# Patient Record
Sex: MALE | Race: WHITE | HISPANIC OR LATINO | Employment: OTHER
[De-identification: names, ages, dates, MRNs, and addresses within clinical notes are randomized per-mention and may not be internally consistent; named-entity substitution may affect disease eponyms.]

---

## 2017-01-03 ENCOUNTER — RECORDS - HEALTHEAST (OUTPATIENT)
Dept: ADMINISTRATIVE | Facility: OTHER | Age: 37
End: 2017-01-03

## 2017-01-03 ENCOUNTER — OFFICE VISIT - HEALTHEAST (OUTPATIENT)
Dept: SURGERY | Facility: CLINIC | Age: 37
End: 2017-01-03

## 2017-01-03 DIAGNOSIS — Z48.89 POSTOPERATIVE VISIT: ICD-10-CM

## 2017-10-03 ENCOUNTER — OFFICE VISIT - HEALTHEAST (OUTPATIENT)
Dept: SURGERY | Facility: CLINIC | Age: 37
End: 2017-10-03

## 2017-10-03 DIAGNOSIS — Z89.611 STATUS POST ABOVE KNEE AMPUTATION OF RIGHT LOWER EXTREMITY: ICD-10-CM

## 2017-10-03 ASSESSMENT — MIFFLIN-ST. JEOR: SCORE: 1741.79

## 2017-12-22 ENCOUNTER — TRANSFERRED RECORDS (OUTPATIENT)
Dept: HEALTH INFORMATION MANAGEMENT | Facility: CLINIC | Age: 37
End: 2017-12-22

## 2018-02-27 ENCOUNTER — TRANSFERRED RECORDS (OUTPATIENT)
Dept: HEALTH INFORMATION MANAGEMENT | Facility: CLINIC | Age: 38
End: 2018-02-27

## 2018-03-09 ENCOUNTER — MEDICAL CORRESPONDENCE (OUTPATIENT)
Dept: HEALTH INFORMATION MANAGEMENT | Facility: CLINIC | Age: 38
End: 2018-03-09

## 2018-03-13 ENCOUNTER — TRANSFERRED RECORDS (OUTPATIENT)
Dept: HEALTH INFORMATION MANAGEMENT | Facility: CLINIC | Age: 38
End: 2018-03-13

## 2018-04-02 ENCOUNTER — OFFICE VISIT (OUTPATIENT)
Dept: PHYSICAL MEDICINE AND REHAB | Facility: CLINIC | Age: 38
End: 2018-04-02
Payer: COMMERCIAL

## 2018-04-02 VITALS
SYSTOLIC BLOOD PRESSURE: 141 MMHG | HEIGHT: 72 IN | BODY MASS INDEX: 25.6 KG/M2 | HEART RATE: 79 BPM | DIASTOLIC BLOOD PRESSURE: 76 MMHG | WEIGHT: 189 LBS

## 2018-04-02 DIAGNOSIS — G89.29 CHRONIC RIGHT SHOULDER PAIN: Primary | ICD-10-CM

## 2018-04-02 DIAGNOSIS — M75.01 ADHESIVE CAPSULITIS OF RIGHT SHOULDER: ICD-10-CM

## 2018-04-02 DIAGNOSIS — M67.911 TENDINOPATHY OF RIGHT ROTATOR CUFF: ICD-10-CM

## 2018-04-02 DIAGNOSIS — M75.41 ROTATOR CUFF IMPINGEMENT SYNDROME, RIGHT: ICD-10-CM

## 2018-04-02 DIAGNOSIS — M77.12 LEFT LATERAL EPICONDYLITIS: ICD-10-CM

## 2018-04-02 DIAGNOSIS — M25.511 CHRONIC RIGHT SHOULDER PAIN: Primary | ICD-10-CM

## 2018-04-02 DIAGNOSIS — Z72.0 TOBACCO ABUSE: ICD-10-CM

## 2018-04-02 RX ORDER — GABAPENTIN 300 MG/1
300 CAPSULE ORAL
COMMUNITY
Start: 2017-08-10

## 2018-04-02 RX ORDER — CITALOPRAM HYDROBROMIDE 20 MG/1
20 TABLET ORAL
COMMUNITY

## 2018-04-02 RX ORDER — BACLOFEN 10 MG/1
10 TABLET ORAL
COMMUNITY
Start: 2016-10-24

## 2018-04-02 RX ORDER — ACETAMINOPHEN 325 MG/1
325-650 TABLET ORAL
COMMUNITY
Start: 2016-12-27

## 2018-04-02 RX ORDER — ASPIRIN 81 MG/1
81 TABLET ORAL
COMMUNITY
Start: 2017-10-16

## 2018-04-02 RX ORDER — ATORVASTATIN CALCIUM 80 MG/1
80 TABLET, FILM COATED ORAL
COMMUNITY

## 2018-04-02 RX ORDER — AMLODIPINE BESYLATE 10 MG/1
10 TABLET ORAL
COMMUNITY

## 2018-04-02 ASSESSMENT — PAIN SCALES - GENERAL: PAINLEVEL: WORST PAIN (10)

## 2018-04-02 NOTE — Clinical Note
"Karmen-  Please send a copy of my note to Dr. Royal Motta at [facility] in [place].    Also, please send a letter on top of the clinic note with this information placed in the letter -   \"Dr. Motta-  Thank you for the referral.  I wanted to introduce myself as I am a new Sports Medicine/PM&R physician that started with the Department of Rehabilitation Medicine in December 2017.  I wanted to let you know that I am happy to see patients of yours for nonsurgical orthopedic/sports medicine/musculoskeletal medicine conditions including peripheral joint abnormalities/disorders, nonoperative spine care, et.  My practice is located on the 3rd floor of the [facility].  Please have patients call [phone] to schedule an appointment.    Thanks!  Best Wishes,   Wyatt Eldridge\""

## 2018-04-02 NOTE — MR AVS SNAPSHOT
After Visit Summary   4/2/2018    Huber Jansen    MRN: [id number]           Patient Information     Date Of Birth          1980        Visit Information        Provider Department      4/2/2018 1:00 PM Wyatt Eldridge DO [facility] Physical Medicine and Rehabilitation        Today's Diagnoses     Chronic right shoulder pain    -  1    Adhesive capsulitis of right shoulder        Tendinopathy of right rotator cuff        Rotator cuff impingement syndrome, right        Left lateral epicondylitis          Care Instructions    We addressed the following today:    1. Right shoulder pain/tendinosis/impingement/adhesive capsulitis  2. Stroke  3. Left lateral epicondylitis    Activity modification as discussed  Home exercise program as instructed  Topical Treatments: Ice or Heat  Over the counter medication: Acetaminophen (Tylenol) 1000 mg every 6 hours with food (Maximum of 3000 mg/day)  Other specific instructions: Referral to orthopedic surgery as requested  Call if interested in injection therapy or further PT as discussed  Follow up as needed for further evaluation/medical care (sooner if needed; call direct clinic number [[phone]] at any time with questions/concerns)            Follow-ups after your visit        Additional Services     ORTHOPEDICS ADULT REFERRAL       Your provider has referred you to: [facility]: Orthopaedic Clinic [facility] [phone]   [email or web address]  - Dr. Shah    Please be aware that coverage of these services is subject to the terms and limitations of your health insurance plan.  Call member services at your health plan with any benefit or coverage questions.      Please bring the following to your appointment:    >>   Any x-rays, CTs or MRIs which have been performed.  Contact the facility where they were done to arrange for  prior to your scheduled appointment.    >>   List of current medications   >>   This referral  request   >>   Any documents/labs given to you for this referral                  Who to contact     Please call your clinic at [phone] to:    Ask questions about your health    Make or cancel appointments    Discuss your medicines    Learn about your test results    Speak to your doctor            Additional Information About Your Visit        MyChart Information     [employer] is an electronic gateway that provides easy, online access to your medical records. With [employer], you can request a clinic appointment, read your test results, renew a prescription or communicate with your care team.     To sign up for [employer] visit the website at [email or web address]   You will be asked to enter the access code listed below, as well as some personal information. Please follow the directions to create your username and password.     Your access code is: [email or web address]  Expires: 2018  3:21 PM     Your access code will  in 90 days. If you need help or a new code, please contact your [facility] or call [phone] for assistance.        Care EveryWhere ID     This is your Care EveryWhere ID. This could be used by other organizations to access your [place] medical records  [id number]        Your Vitals Were     Pulse Height BMI (Body Mass Index)             79 1.829 m (6') 25.63 kg/m2          Blood Pressure from Last 3 Encounters:   18 141/76    Weight from Last 3 Encounters:   18 85.7 kg (189 lb)              We Performed the Following     ORTHOPEDICS ADULT REFERRAL        Primary Care Provider Office Phone # Fax #    Royal Motta [phone]       [facility] [address]  [place] [facility] [address]        Equal Access to Services     CATARINA COHEN AH: Hadii deidra Sierra, waneena luong, qaybta liudmila mendieta. So [facility] [phone].    ATENCIÓN: Si rosanna sterling  lopez disposición servicios gratuitos de asistencia lingüística. Heydi bang [phone].    We comply with applicable federal civil rights laws and [place] laws. We do not discriminate on the basis of race, color, national origin, age, disability, sex, sexual orientation, or gender identity.            Thank you!     Thank you for choosing [facility] PHYSICAL MEDICINE AND REHABILITATION  for your care. Our goal is always to provide you with excellent care. Hearing back from our patients is one way we can continue to improve our services. Please take a few minutes to complete the written survey that you may receive in the mail after your visit with us. Thank you!             Your Updated Medication List - Protect others around you: Learn how to safely use, store and throw away your medicines at www.disposemymeds.org.          This list is accurate as of 4/2/18  2:12 PM.  Always use your most recent med list.                   Brand Name Dispense Instructions for use Diagnosis    acetaminophen 325 MG tablet    TYLENOL     Take 325-650 mg by mouth        amLODIPine 10 MG tablet    NORVASC     Take 10 mg by mouth        aspirin EC 81 MG EC tablet      Take 81 mg by mouth        atorvastatin 80 MG tablet    LIPITOR     Take 80 mg by mouth        baclofen 10 MG tablet    LIORESAL     Take 10 mg by mouth        citalopram 20 MG tablet    celeXA     Take 20 mg by mouth        gabapentin 300 MG capsule    NEURONTIN     Take 300 mg by mouth        humaLOG 100 UNIT/ML injection   Generic drug:  insulin lispro      If BG<70, treat per hypoglycemia protocol, then administer dose when patient eats meal.  Breakfast 1 units per 5 CHO grams Lunch 1 units per 5 CHO grams Supper 1 units per 5 CHO grams 10.65 Type 1 with hyperglycemia        ROSUVASTATIN CALCIUM PO      Take 40 mg by mouth daily

## 2018-04-02 NOTE — LETTER
4/2/2018       RE: Huber Jansen  [address]  [place] [address]     Dear Colleague,    Thank you for referring your patient, Huber Jansen, to the [facility] PHYSICAL MEDICINE AND REHABILITATION at [facility]. Please see a copy of my visit note below.    [facility] Physical Medicine & Rehabilitation Clinic Note  Clinic Visit s Apr 2, 2018    Subjective:  Huber Jansen is a 37 year old right-hand dominant male, who is seen in consultation at the request of Dr. Motta for evaluation of chronic right shoulder pain and acute left elbow pain.    Symptoms began 1 year ago for the right shoulder and 1 month ago for the left elbow with physical activities.  Reports insidious onset without acute precipitating event, possibly related to a left-sided stroke at that time for the right shoulder.  Reports right shoulder pain that is located anterolateral with radiation absent.  Notes left lateral elbow pain without radiation.  Symptoms are generally worse with sleeping activities (right shoulder) and and better with Botox injections ([facility] - Dr. An - right shoulder) and elbow strap (left elbow).  Treatment has consisted of physical therapy ([facility]), kinesiotaping, and previous corticosteroid injections ([facility]I in 2017 and 2016 - mostly glenohumeral joint corticosteroid injections via fluoroscopy - improvement of symptoms for 1.5 months).  Denies any numbness/tingling/weakness of the upper extremities.  Notes weakness of the rightupper extremity (improving).  Denies any previous right shoulder injuries/surgeries.    Patient's past medical, surgical, social, and family histories are reviewed today.  Significant medical history includes a left-sided stroke    Review of Systems:  Constitutional: NEGATIVE for fever, chills, or change in weight  Skin: POSITIVE for pimples - last 6 months  Neuro: POSITIVE for weakness of the right upper extremity  MSK: see  HPI    Objective:  /76  Pulse 79  Ht 1.829 m (6')  Wt 85.7 kg (189 lb)  BMI 25.63 kg/m2  General: healthy, alert, and in mild distress  Skin: no suspicious lesions or rashes  Psych: mentation appears normal and affect normal/bright  HEENT: no scleral icterus  CV: no pedal edema  Resp: normal respiratory effort without conversational dyspnea   Neuro: sensory exam is within normal limits.  Motor strength as noted below  Lymph: no palpable lymphadenopathy    MSK:    RIGHT SHOULDER  Inspection:    Decreased shoulder height of the right shoulder compared to left with atrophy noted of the right upper extremity  Palpation:    No tenderness over the AC joint, acromion, anterior capsule, or greater tuberosity    Crepitus is absent  Active Range of Motion:     Abduction 800 /    Passive Range of Motion:    Abduction and forward flexion limited by tightness/pain  Strength:    Scapular plane abduction 4/5  Special Tests:    Positive: Mcarthur' and supraspinatus (empty can)    Negative: Neer's and Yergason's    Contralateral shoulder examined - range of motion, strength, and function within normal limits    LEFT ELBOW  Inspection:    No swelling, bruising, discoloration, or obvious deformity or asymmetry  Palpation:    No tenderness over the lateral epicondyle, common extensor tendon, medial epicondyle, common flexor tendon, olecranon bursa, distal bicep tendon, or radial head/neck  Active Range of Motion:     Extension normal / flexion normal / pronation normal / supination normal  Special Tests:    Positive: none    Negative: Pain with resisted wrist extension, pain with resisted middle finger extension, pain with resisted wrist flexion, pain with resisted supination, and pain with resisted pronation    Imaging:  No x-rays indicated during today's visit  Outside films from [facility] were reviewed today, independent visualization of images was performed, and results were discussed with the patient  MR of  the Right Shoulder - 7/18/2016  Impression:  1.  Mild subscapularis tendinopathy without evidence of tearing.  2. Moderate severity tendinopathy within the anterior third to anterior half of the supraspinatus which demonstrates some attenuation relative to the posterior half of the tendinous attachment suggesting changes of tendinosis are likely subacute to chronic.  No evidence for partial full-thickness tear with no retraction present.  3. Mild tendinopathy within the posterior supraspinatus extending into the anterior infraspinatus.  4. Mild tendinopathy within the intra-articular biceps with associated tenosynovitis.  5. Sprain or inflammation of the in inferior glenohumeral ligament.  6. Mild enthesophyte formation at the acromial tip could potentially predispose to rotator cuff impingement.  Trace fluid in the subacromial space but no significant effacement of the space on this examination.    ASSESSMENT:  1.  Chronic right shoulder pain  2.  Adhesive capsulitis of right shoulder  3.  Right rotator cuff tendinopathy  4.  Right rotator cuff impingement  5.  Left lateral epicondylitis  6.  History of left-sided ischemic stroke  7.  Tobacco abuse    PLAN:  1.  Discussed unpredictable nature of additional treatment options at this time including additional Botulinum toxin injections, a right glenohumeral joint platelet rich plasma injection/viscosupplementation injection/hydrodilatation procedure with ultrasound guidance, surgical intervention for consideration of manipulation under anesthesia, rotator cuff debridement, distal clavicle resection/subacromial decompression, further formal physical therapy etc.  2.  Patient wished to proceed with referral to orthopedic surgery for consideration of operative intervention for further treatment purposes.  3.  Activity modification as discussed, including limitation of activities that cause pain/discomfort.  4.  Acetaminophen/ice/heat as needed for improved pain  control.  5.  Home exercise program given to the patient including left wrist extension/flexion exercises for improvement of current symptom state.  6.  Continue with use of elbow strap as discussed for treatment purposes.  7.  Discussed importance of tobacco cessation for improvement of current symptom state.  8.  Call if interested in injection therapy or further PT as discussed for further treatment purposes.  9.  Follow-up as needed for further evaluation/medical care.      I spent a total of 30 minutes face-to-face with the patient during today's office visit.  Over 50% of the time was spent counseling the patient and/or coordinating care.  See office note for details.    Again, thank you for allowing me to participate in the care of your patient.      Sincerely,    Wyatt Eldridge, DO

## 2018-04-02 NOTE — PROGRESS NOTES
[facility] Physical Medicine & Rehabilitation Clinic Note  Clinic Visit s Apr 2, 2018    Subjective:  Huber Jansen is a 37 year old right-hand dominant male, who is seen in consultation at the request of Dr. Motta for evaluation of chronic right shoulder pain and acute left elbow pain.    Symptoms began 1 year ago for the right shoulder and 1 month ago for the left elbow with physical activities.  Reports insidious onset without acute precipitating event, possibly related to a left-sided stroke at that time for the right shoulder.  Reports right shoulder pain that is located anterolateral with radiation absent.  Notes left lateral elbow pain without radiation.  Symptoms are generally worse with sleeping activities (right shoulder) and and better with Botox injections ([facility] - Dr. An - right shoulder) and elbow strap (left elbow).  Treatment has consisted of physical therapy ([facility]), kinesiotaping, and previous corticosteroid injections ([facility] in 2017 and 2016 - mostly glenohumeral joint corticosteroid injections via fluoroscopy - improvement of symptoms for 1.5 months).  Denies any numbness/tingling/weakness of the upper extremities.  Notes weakness of the rightupper extremity (improving).  Denies any previous right shoulder injuries/surgeries.    Patient's past medical, surgical, social, and family histories are reviewed today.  Significant medical history includes a left-sided stroke    Review of Systems:  Constitutional: NEGATIVE for fever, chills, or change in weight  Skin: POSITIVE for pimples - last 6 months  Neuro: POSITIVE for weakness of the right upper extremity  MSK: see HPI    Objective:  /76  Pulse 79  Ht 1.829 m (6')  Wt 85.7 kg (189 lb)  BMI 25.63 kg/m2  General: healthy, alert, and in mild distress  Skin: no suspicious lesions or rashes  Psych: mentation appears normal and affect normal/bright  HEENT: no scleral icterus  CV: no pedal edema  Resp:  normal respiratory effort without conversational dyspnea   Neuro: sensory exam is within normal limits.  Motor strength as noted below  Lymph: no palpable lymphadenopathy    MSK:    RIGHT SHOULDER  Inspection:    Decreased shoulder height of the right shoulder compared to left with atrophy noted of the right upper extremity  Palpation:    No tenderness over the AC joint, acromion, anterior capsule, or greater tuberosity    Crepitus is absent  Active Range of Motion:     Abduction 800 /    Passive Range of Motion:    Abduction and forward flexion limited by tightness/pain  Strength:    Scapular plane abduction 4/5  Special Tests:    Positive: Mcarthur' and supraspinatus (empty can)    Negative: Neer's and Yergason's    Contralateral shoulder examined - range of motion, strength, and function within normal limits    LEFT ELBOW  Inspection:    No swelling, bruising, discoloration, or obvious deformity or asymmetry  Palpation:    No tenderness over the lateral epicondyle, common extensor tendon, medial epicondyle, common flexor tendon, olecranon bursa, distal bicep tendon, or radial head/neck  Active Range of Motion:     Extension normal / flexion normal / pronation normal / supination normal  Special Tests:    Positive: none    Negative: Pain with resisted wrist extension, pain with resisted middle finger extension, pain with resisted wrist flexion, pain with resisted supination, and pain with resisted pronation    Imaging:  No x-rays indicated during today's visit  Outside films from [facility] were reviewed today, independent visualization of images was performed, and results were discussed with the patient  MR of the Right Shoulder - 7/18/2016  Impression:  1.  Mild subscapularis tendinopathy without evidence of tearing.  2. Moderate severity tendinopathy within the anterior third to anterior half of the supraspinatus which demonstrates some attenuation relative to the posterior half of the tendinous  attachment suggesting changes of tendinosis are likely subacute to chronic.  No evidence for partial full-thickness tear with no retraction present.  3. Mild tendinopathy within the posterior supraspinatus extending into the anterior infraspinatus.  4. Mild tendinopathy within the intra-articular biceps with associated tenosynovitis.  5. Sprain or inflammation of the in inferior glenohumeral ligament.  6. Mild enthesophyte formation at the acromial tip could potentially predispose to rotator cuff impingement.  Trace fluid in the subacromial space but no significant effacement of the space on this examination.    ASSESSMENT:  1.  Chronic right shoulder pain  2.  Adhesive capsulitis of right shoulder  3.  Right rotator cuff tendinopathy  4.  Right rotator cuff impingement  5.  Left lateral epicondylitis  6.  History of left-sided ischemic stroke  7.  Tobacco abuse    PLAN:  1.  Discussed unpredictable nature of additional treatment options at this time including additional Botulinum toxin injections, a right glenohumeral joint platelet rich plasma injection/viscosupplementation injection/hydrodilatation procedure with ultrasound guidance, surgical intervention for consideration of manipulation under anesthesia, rotator cuff debridement, distal clavicle resection/subacromial decompression, further formal physical therapy etc.  2.  Patient wished to proceed with referral to orthopedic surgery for consideration of operative intervention for further treatment purposes.  3.  Activity modification as discussed, including limitation of activities that cause pain/discomfort.  4.  Acetaminophen/ice/heat as needed for improved pain control.  5.  Home exercise program given to the patient including left wrist extension/flexion exercises for improvement of current symptom state.  6.  Continue with use of elbow strap as discussed for treatment purposes.  7.  Discussed importance of tobacco cessation for improvement of current  symptom state.  8.  Call if interested in injection therapy or further PT as discussed for further treatment purposes.  9.  Follow-up as needed for further evaluation/medical care.      I spent a total of 30 minutes face-to-face with the patient during today's office visit.  Over 50% of the time was spent counseling the patient and/or coordinating care.  See office note for details.    Wyatt Eldridge DO, AGUSTIN    Department of Rehabilitation Medicine

## 2018-04-02 NOTE — PATIENT INSTRUCTIONS
We addressed the following today:    1. Right shoulder pain/tendinosis/impingement/adhesive capsulitis  2. Stroke  3. Left lateral epicondylitis    Activity modification as discussed  Home exercise program as instructed  Topical Treatments: Ice or Heat  Over the counter medication: Acetaminophen (Tylenol) 1000 mg every 6 hours with food (Maximum of 3000 mg/day)  Other specific instructions: Referral to orthopedic surgery as requested  Call if interested in injection therapy or further PT as discussed  Follow up as needed for further evaluation/medical care (sooner if needed; call direct clinic number [[phone]] at any time with questions/concerns)

## 2018-04-06 ENCOUNTER — CARE COORDINATION (OUTPATIENT)
Dept: PHYSICAL MEDICINE AND REHAB | Facility: CLINIC | Age: 38
End: 2018-04-06

## 2018-04-06 NOTE — PROGRESS NOTES
Dr Eldridge's PMR clinic note dated 4/2/18 was faxed to \[facility]\"" clinic in [place] [phone], attention Dr Royal Motta.

## 2018-04-25 ENCOUNTER — PRE VISIT (OUTPATIENT)
Dept: ORTHOPEDICS | Facility: CLINIC | Age: 38
End: 2018-04-25

## 2018-04-25 DIAGNOSIS — M25.511 RIGHT SHOULDER PAIN: Primary | ICD-10-CM

## 2018-04-25 NOTE — TELEPHONE ENCOUNTER
Patient is being referred by Wyatt Eldridge for Chronic right shoulder pain, Adhesive capsulitis of right shoulder.Tendinopathy of right rotator cuff Rotator cuff impingement syndrome.    Patient is new to this provider.  Patient has no recent imaging available    Patient is coming to clinic for initial consultation.      Per standing orders, Right shoulder Alyssa views have been ordered and scheduled.     Patient visit type and questionnaires have been reviewed and are correct for this appointment? Yes    Daniel RAMON, CMA

## 2018-04-30 ENCOUNTER — OFFICE VISIT (OUTPATIENT)
Dept: ORTHOPEDICS | Facility: CLINIC | Age: 38
End: 2018-04-30
Payer: COMMERCIAL

## 2018-04-30 ENCOUNTER — RADIANT APPOINTMENT (OUTPATIENT)
Dept: GENERAL RADIOLOGY | Facility: CLINIC | Age: 38
End: 2018-04-30
Attending: ORTHOPAEDIC SURGERY
Payer: COMMERCIAL

## 2018-04-30 VITALS — BODY MASS INDEX: 25.6 KG/M2 | HEIGHT: 72 IN | WEIGHT: 189 LBS

## 2018-04-30 DIAGNOSIS — M25.511 RIGHT SHOULDER PAIN: ICD-10-CM

## 2018-04-30 DIAGNOSIS — G89.29 CHRONIC RIGHT SHOULDER PAIN: Primary | ICD-10-CM

## 2018-04-30 DIAGNOSIS — M25.511 CHRONIC RIGHT SHOULDER PAIN: Primary | ICD-10-CM

## 2018-04-30 ASSESSMENT — ENCOUNTER SYMPTOMS
DEPRESSION: 1
NECK PAIN: 1
MYALGIAS: 1
MUSCLE WEAKNESS: 0
NUMBNESS: 0
WEAKNESS: 1
BACK PAIN: 1
PANIC: 1
NERVOUS/ANXIOUS: 1
SEIZURES: 0
INSOMNIA: 1
SPEECH CHANGE: 1
TREMORS: 0
LOSS OF CONSCIOUSNESS: 0
ARTHRALGIAS: 1
JOINT SWELLING: 1
POOR WOUND HEALING: 0
HEADACHES: 1
PARALYSIS: 0
DIZZINESS: 1
MEMORY LOSS: 1
DISTURBANCES IN COORDINATION: 1
SKIN CHANGES: 0
TINGLING: 0
NAIL CHANGES: 0
DECREASED CONCENTRATION: 1
STIFFNESS: 0

## 2018-04-30 NOTE — PROGRESS NOTES
"CHIEF COMPLAINT:  Right shoulder pain.      HISTORY OF PRESENT ILLNESS:  Mr. Jansen is a very pleasant 37-year-old male with a history of pain and disability in his right shoulder.  He has had chronic shoulder pain since a stroke in 09/2016.  This was a left-sided stroke that affected his right upper extremity.  He has had profound and significant shoulder, elbow and wrist and hand dysfunction since then.  He was seen at [facility] by one of the orthopedic surgeons.  He has been in physical therapy.  He saw Dr. Eldridge here in the Physical Medicine and Rehabilitation Department who recommended that he seek evaluation by me.      Mr. Jansen describes it as a \"tightness\" in the shoulder and then states that the shoulder is \"falling out of place\".  He notes he feels pain anteriorly with any kind of bringing the arm away from the side into abduction.      PHYSICAL EXAMINATION:  On exam today, he has sensation intact to light touch in the axillary nerve distribution, thumb and palm deformity of the hand with no active range of motion whatsoever.  He keeps his elbow in flexion.  He has pain with any range of motion of his shoulder but a biju obvious dyskinetic shoulder motion.      RADIOGRAPHS:  I reviewed 4 views of his shoulder and they show no evidence of fracture, dislocation or abnormal calcific focus.      ASSESSMENT:  Right shoulder status post post-cerebral cerebrovascular accident pain.      PLAN:  I had a nice talk with Mr. Jansen today.  I do not think that there are any surgical interventions which are likely to benefit him.  He has a dysfunctional arm and so even shoulder arthrodesis would not benefit him.  Arthroscopic debridement, arthroscopic capsular resection, hydro plasty, manipulation under anesthesia, and joint replacement were all discussed.  None of these would benefit him as he has too much dysfunction in his musculature to provide him with a reasonable chance of rehabilitation.  Because he has a " dysfunctional hand, arthrodesis is out of the question as well.  He demonstrated some understanding and he can follow up as needed.       Answers for HPI/ROS submitted by the patient on 4/30/2018   General Symptoms: No  Skin Symptoms: Yes  HENT Symptoms: No  EYE SYMPTOMS: No  HEART SYMPTOMS: No  LUNG SYMPTOMS: No  INTESTINAL SYMPTOMS: No  URINARY SYMPTOMS: No  REPRODUCTIVE SYMPTOMS: No  SKELETAL SYMPTOMS: Yes  BLOOD SYMPTOMS: No  NERVOUS SYSTEM SYMPTOMS: Yes  MENTAL HEALTH SYMPTOMS: Yes  Changes in hair: No  Changes in moles/birth marks: No  Itching: Yes  Rashes: Yes  Changes in nails: No  Acne: Yes  Change in facial hair: No  Warts: No  Non-healing sores: No  Scarring: No  Flaking of skin: No  Color changes of hands/feet in cold : No  Sun sensitivity: Yes  Skin thickening: No  Back pain: Yes  Muscle aches: Yes  Neck pain: Yes  Swollen joints: Yes  Joint pain: Yes  Bone pain: No  Muscle weakness: No  Joint stiffness: No  Bone fracture: No  Trouble with coordination: Yes  Dizziness or trouble with balance: Yes  Fainting or black-out spells: No  Memory loss: Yes  Headache: Yes  Seizures: No  Speech problems: Yes  Tingling: No  Tremor: No  Weakness: Yes  Difficulty walking: Yes  Paralysis: No  Numbness: No  Nervous or Anxious: Yes  Depression: Yes  Trouble sleeping: Yes  Trouble thinking or concentrating: Yes  Mood changes: Yes  Panic attacks: Yes

## 2018-04-30 NOTE — MR AVS SNAPSHOT
After Visit Summary   2018    Huber Jansen    MRN: [id number]           Patient Information     Date Of Birth          1980        Visit Information        Provider Department      2018 1:30 PM Rene Shah MD [facility]        Today's Diagnoses     Chronic right shoulder pain    -  1       Follow-ups after your visit        Follow-up notes from your care team     Return if symptoms worsen or fail to improve.      Who to contact     Please call your clinic at [phone] to:    Ask questions about your health    Make or cancel appointments    Discuss your medicines    Learn about your test results    Speak to your doctor            Additional Information About Your Visit        MyChart Information     Mark[employer]ot is an electronic gateway that provides easy, online access to your medical records. With ARDACO, you can request a clinic appointment, read your test results, renew a prescription or communicate with your care team.     To sign up for Markadot visit the website at [email or web address]   You will be asked to enter the access code listed below, as well as some personal information. Please follow the directions to create your username and password.     Your access code is: [email or web address]  Expires: 2018  3:21 PM     Your access code will  in 90 days. If you need help or a new code, please contact your [facility] or call [phone] for assistance.        Care EveryWhere ID     This is your Care EveryWhere ID. This could be used by other organizations to access your [facility] medical records  [id number]        Your Vitals Were     Height BMI (Body Mass Index)                1.829 m (6') 25.63 kg/m2           Blood Pressure from Last 3 Encounters:   18 141/76    Weight from Last 3 Encounters:   18 85.7 kg (189 lb)   18 85.7 kg (189 lb)              Today, you had the following     No orders  found for display       Primary Care Provider Office Phone # Fax #    Royal Motta [phone]       [facility] [address]  [place] [facility] [address]        Equal Access to Services     CATARINA COHEN : Hadii aad ku hadmarloo Soomaali, waaxda luqadaha, qaybta kaalmada adeegyada, waxkuldeep idiin hayaan joe romeroalda mauro. So [facility] [phone].    ATENCIÓN: Si habla español, tiene a lopez disposición servicios gratuitos de asistencia lingüística. Llame al [phone].    We comply with applicable federal civil rights laws and [place] laws. We do not discriminate on the basis of race, color, national origin, age, disability, sex, sexual orientation, or gender identity.            Thank you!     Thank you for choosing [facility]  for your care. Our goal is always to provide you with excellent care. Hearing back from our patients is one way we can continue to improve our services. Please take a few minutes to complete the written survey that you may receive in the mail after your visit with us. Thank you!             Your Updated Medication List - Protect others around you: Learn how to safely use, store and throw away your medicines at www.disposemymeds.org.          This list is accurate as of 4/30/18 11:59 PM.  Always use your most recent med list.                   Brand Name Dispense Instructions for use Diagnosis    acetaminophen 325 MG tablet    TYLENOL     Take 325-650 mg by mouth        amLODIPine 10 MG tablet    NORVASC     Take 10 mg by mouth        aspirin EC 81 MG EC tablet      Take 81 mg by mouth        atorvastatin 80 MG tablet    LIPITOR     Take 80 mg by mouth        baclofen 10 MG tablet    LIORESAL     Take 10 mg by mouth        citalopram 20 MG tablet    celeXA     Take 20 mg by mouth        gabapentin 300 MG capsule    NEURONTIN     Take 300 mg by mouth        humaLOG 100 UNIT/ML injection   Generic drug:  insulin lispro      If BG<70, treat per  hypoglycemia protocol, then administer dose when patient eats meal.  Breakfast 1 units per 5 CHO grams Lunch 1 units per 5 CHO grams Supper 1 units per 5 CHO grams 10.65 Type 1 with hyperglycemia        ROSUVASTATIN CALCIUM PO      Take 40 mg by mouth daily

## 2018-04-30 NOTE — NURSING NOTE
Reason For Visit:   Chief Complaint   Patient presents with     Consult     Discuss surgical options. Chronic right shoulder pain, Adhesive capsulitis of right shoulder.Tendinopathy of right rotator cuff. Rotator cuff impingement syndromeSummit. Appt per pt. Ref by Wyatt Eldridge. Medical records from [facility]       PCP: Royal Motta  Ref: Dr. Eldridge    ?  No  Occupation None.  Currently working? No.  Work status?  On disability.  Date of injury: 04/2016 - 09/2016  Type of injury: Lifting something into the trash - Had a stroke.  Date of surgery: None  Type of surgery: None.  Smoker: Yes  Request smoking cessation information: No    right hand dominant    SANE score  Affected shoulder: Right  Right shoulder SANE: 5  Left shoulder SANE: 100    Dynamometer  Forward Elevation:  R = 5 pounds,  L = 25 pounds  External Rotation:   R = 0 pounds,  L = 22 pounds    Ht 1.829 m (6')  Wt 85.7 kg (189 lb)  BMI 25.63 kg/m2      Pain Assessment  Patient Currently in Pain: Yes  0-10 Pain Scale: 3  Primary Pain Location: Shoulder  Pain Orientation: Right  Pain Descriptors: Dull, Aching, Sore  Alleviating Factors: Rest  Aggravating Factors: Lying, Movement, Reaching, Stretching, Bending, Other (comment) (lifting)      Daniel RAMON, CMA

## 2018-04-30 NOTE — LETTER
"4/30/2018       RE: Huber Jansen  [address]  [facility] [address]     Dear Colleague,    Thank you for referring your patient, Huber Jansen, to the [facility] at [facility]. Please see a copy of my visit note below.    CHIEF COMPLAINT:  Right shoulder pain.      HISTORY OF PRESENT ILLNESS:  Mr. Jansen is a very pleasant 37-year-old male with a history of pain and disability in his right shoulder.  He has had chronic shoulder pain since a stroke in 09/2016.  This was a left-sided stroke that affected his right upper extremity.  He has had profound and significant shoulder, elbow and wrist and hand dysfunction since then.  He was seen at [facility] by one of the orthopedic surgeons.  He has been in physical therapy.  He saw Dr. Eldridge here in the Physical Medicine and Rehabilitation Department who recommended that he seek evaluation by me.      Mr. Jansen describes it as a \"tightness\" in the shoulder and then states that the shoulder is \"falling out of place\".  He notes he feels pain anteriorly with any kind of bringing the arm away from the side into abduction.      PHYSICAL EXAMINATION:  On exam today, he has sensation intact to light touch in the axillary nerve distribution, thumb and palm deformity of the hand with no active range of motion whatsoever.  He keeps his elbow in flexion.  He has pain with any range of motion of his shoulder but a biju obvious dyskinetic shoulder motion.      RADIOGRAPHS:  I reviewed 4 views of his shoulder and they show no evidence of fracture, dislocation or abnormal calcific focus.      ASSESSMENT:  Right shoulder status post post-cerebral cerebrovascular accident pain.      PLAN:  I had a nice talk with Mr. Jansen today.  I do not think that there are any surgical interventions which are likely to benefit him.  He has a dysfunctional arm and so even shoulder arthrodesis would not benefit him.  Arthroscopic " debridement, arthroscopic capsular resection, hydro plasty, manipulation under anesthesia, and joint replacement were all discussed.  None of these would benefit him as he has too much dysfunction in his musculature to provide him with a reasonable chance of rehabilitation.  Because he has a dysfunctional hand, arthrodesis is out of the question as well.  He demonstrated some understanding and he can follow up as needed.       Again, thank you for allowing me to participate in the care of your patient.      Sincerely,    Rene Shah MD

## 2020-03-11 ENCOUNTER — COMMUNICATION - HEALTHEAST (OUTPATIENT)
Dept: ADMINISTRATIVE | Facility: CLINIC | Age: 40
End: 2020-03-11

## 2020-03-11 ENCOUNTER — RECORDS - HEALTHEAST (OUTPATIENT)
Dept: ADMINISTRATIVE | Facility: OTHER | Age: 40
End: 2020-03-11

## 2021-05-25 ENCOUNTER — RECORDS - HEALTHEAST (OUTPATIENT)
Dept: ADMINISTRATIVE | Facility: CLINIC | Age: 41
End: 2021-05-25

## 2021-05-28 ENCOUNTER — RECORDS - HEALTHEAST (OUTPATIENT)
Dept: ADMINISTRATIVE | Facility: CLINIC | Age: 41
End: 2021-05-28

## 2021-05-30 ENCOUNTER — RECORDS - HEALTHEAST (OUTPATIENT)
Dept: ADMINISTRATIVE | Facility: CLINIC | Age: 41
End: 2021-05-30

## 2021-05-31 VITALS — WEIGHT: 175 LBS | BODY MASS INDEX: 23.7 KG/M2 | HEIGHT: 72 IN

## 2021-06-06 NOTE — TELEPHONE ENCOUNTER
External - [facility] -   Referring Provider: Royal Motta  DX: Dm Type 1    Ref./rec. Were received on... 3/11/2020 10:02am (inside End[facility]).

## 2021-06-08 NOTE — PROGRESS NOTES
Huber comes in for follow-up of her right above-knee amputation.  This unfortunate gentleman was initially seen at [facility] about 6 months ago with a severe stroke with resultant right upper extremity hemiparesis.  He then developed ischemia of his right leg.  They attempted stenting there but this unfortunately failed.  After much discussion and second opinion the patient opted for an amputation.  Since then he's apparently been doing well.  He is now home from rehab.    Physical exam:  Is moving around reasonably well considering he has complete paralysis of his right upper extremity.  The right stump looks excellent.  I removed the staples today.  The skin is healthy.  The incision is completely healed.    Impression: Status post right above-knee amputation.  Healing very well.  Given him a prescription for a prosthesis and I think they can start feeding him at any time.  He is also going to be following up with Dr. Dagoberto Colmenares is far as his left leg is concerned.  I told him follow-up with me can be as needed.  If he has any questions concerns about anything going on with his right stump I would want to see him back.

## 2021-06-13 NOTE — PROGRESS NOTES
Huber comes in today with some complaints of pain in his right leg.  It is primarily in the lateral aspect of his thigh.  It is worse toward the end of the day and sometimes keeps him up at night.  He has been seeing a physical therapist and he states that when they do massage to the area it helps quite a bit.  He otherwise feels like he is doing fairly well.  He does still use a wheelchair fair amount but is ambulatory with his prosthesis.    Physical exam:  /76  Pulse 81  Ht 6' (1.829 m)  Wt 175 lb (79.4 kg)  SpO2 96%  BMI 23.73 kg/m2  General: Healthy appearing young man.  He has no use of his right arm.  Extremities: Right above knee amputation site looks healthy.  He has hair on his leg consistent with good vascularity.  The quadriceps muscles are soft and nontender no palpable masses.  I do not feel any evidence of neuromas at the amputation site.    Impression: I suspect some of this pain is just in the use of the muscle with walking with his prosthesis.  Unfortunately I do not think there is much I have to offer.  It looks very healthy and he is glad to hear that.  He says the pain is not bad and he can live with it he just wanted to sure there was nothing wrong.  I did offer him a visit to the pain clinic but he does not think that is necessary.  He does get relief with massage and I told him perhaps he could find even a medical massage therapist work to get covered by insurance.  He is going to look into that.  Follow-up with me will be as needed.

## 2021-10-10 ENCOUNTER — HOSPITAL ENCOUNTER (EMERGENCY)
Facility: CLINIC | Age: 41
Discharge: HOME | End: 2021-10-11
Payer: MEDICARE

## 2021-10-10 VITALS
TEMPERATURE: 98.2 F | HEART RATE: 83 BPM | RESPIRATION RATE: 16 BRPM | OXYGEN SATURATION: 97 % | DIASTOLIC BLOOD PRESSURE: 93 MMHG | SYSTOLIC BLOOD PRESSURE: 187 MMHG | BODY MASS INDEX: 21.7 KG/M2 | WEIGHT: 160 LBS

## 2022-06-11 ENCOUNTER — HOSPITAL ENCOUNTER (EMERGENCY)
Facility: CLINIC | Age: 42
Discharge: HOME | End: 2022-06-11
Attending: FAMILY MEDICINE | Admitting: FAMILY MEDICINE
Payer: MEDICARE

## 2022-06-11 VITALS
SYSTOLIC BLOOD PRESSURE: 154 MMHG | HEART RATE: 79 BPM | RESPIRATION RATE: 18 BRPM | TEMPERATURE: 98.3 F | BODY MASS INDEX: 23.7 KG/M2 | HEIGHT: 72 IN | WEIGHT: 175 LBS | OXYGEN SATURATION: 97 % | DIASTOLIC BLOOD PRESSURE: 83 MMHG

## 2022-06-11 DIAGNOSIS — S05.02XA ABRASION OF LEFT CORNEA, INITIAL ENCOUNTER: ICD-10-CM

## 2022-06-11 PROCEDURE — 250N000009 HC RX 250: Performed by: FAMILY MEDICINE

## 2022-06-11 PROCEDURE — 99283 EMERGENCY DEPT VISIT LOW MDM: CPT

## 2022-06-11 RX ORDER — ERYTHROMYCIN 5 MG/G
0.5 OINTMENT OPHTHALMIC 2 TIMES DAILY
Qty: 5 G | Refills: 0 | Status: SHIPPED | OUTPATIENT
Start: 2022-06-11 | End: 2022-06-16

## 2022-06-11 RX ORDER — TETRACAINE HYDROCHLORIDE 5 MG/ML
2 SOLUTION OPHTHALMIC ONCE
Status: COMPLETED | OUTPATIENT
Start: 2022-06-11 | End: 2022-06-11

## 2022-06-11 RX ADMIN — FLUORESCEIN SODIUM 1 STRIP: 1 STRIP OPHTHALMIC at 22:04

## 2022-06-11 RX ADMIN — TETRACAINE HYDROCHLORIDE 2 DROP: 5 SOLUTION OPHTHALMIC at 22:05

## 2022-06-11 ASSESSMENT — ENCOUNTER SYMPTOMS
EYE PAIN: 0
EYE REDNESS: 1

## 2022-06-12 NOTE — ED TRIAGE NOTES
"Pt presents to the ED with c/o left eye pain and redness since earlier this morning. Pt states it \"feels like something is in it\". 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       "

## 2022-06-12 NOTE — ED PROVIDER NOTES
EMERGENCY DEPARTMENT ENCOUNTER      NAME: Huber Jansen  AGE: 41 year old male  YOB: 1980  MRN: [id number]  EVALUATION DATE & TIME: 6/11/2022  9:26 PM    PCP: Royal Motta    ED PROVIDER: Dagoberto Underwood M.D.    Chief Complaint   Patient presents with     Eye Problem       FINAL IMPRESSION:  1. Abrasion of left cornea, initial encounter        ED COURSE & MEDICAL DECISION MAKING:    Pertinent Labs & Imaging studies personally reviewed and interpreted by me. (See chart for details)  10:00 PM Patient seen and examined, prior records reviewed.  Patient with left eye pain today, he is already blind in this eye so no vision changes.  On fluorescein stain there is a corneal abrasion which likely is the source of his discomfort.  With Romycin ointment ordered and should follow-up with ophthalmology.    At the conclusion of the encounter I discussed the results of all of the tests and the disposition. The questions were answered. The patient or family acknowledged understanding and was agreeable with the care plan.     PROCEDURES:   Procedures       PROCEDURE: Woods lamp Exam   INDICATIONS: Left eye irritation   PROCEDURE PROVIDER: Dr Dagoberto Underwood    SITE: Left eye   CONSENT:   The risks, benefits and alternatives for this procedure were explained to the patient and verbally accepted.     MEDICATION: fluorescein stain and tetracaine   EXAM FINDINGS: Left Eye: 3 mm corneal abrasion medial to pupil   COMPLICATIONS: Patient tolerated procedure well, without complication        MEDICATIONS GIVEN IN THE EMERGENCY:  Medications   tetracaine (PONTOCAINE) 0.5 % ophthalmic solution 2 drop (2 drops Left Eye Given 6/11/22 2205)   fluorescein (FUL-KARLY) ophthalmic strip 1 strip (1 strip Left Eye Given 6/11/22 2204)       NEW PRESCRIPTIONS STARTED AT TODAY'S ER VISIT  New Prescriptions    ERYTHROMYCIN (ROMYCIN) 5 MG/GM OPHTHALMIC OINTMENT    Place 0.5 inches Into the left eye 2 times daily for 5 days        =================================================================    HPI    Patient information was obtained from: patient      Huber Jansen is a 41 year old male with a pertinent history of insulin dependent DM I, CVA anticoagulated on Plavix, and left eye blindness, who presents to this ED via private vehicle for evaluation of eye irritation.    Patient reports onset of left eye irritation and erythema that began this afternoon, noting it feels as though something is stuck in his eye. Denies eye pain. Otherwise denies any other symptoms or concerns at this time.       REVIEW OF SYSTEMS   Review of Systems   Eyes: Positive for redness (left). Negative for pain.        Positive for left eye irritation   All other systems reviewed and are negative.     All other systems reviewed and negative    PAST MEDICAL HISTORY:  No past medical history on file.    PAST SURGICAL HISTORY:  Past Surgical History:   Procedure Laterality Date     AMPUTATE LEG ABOVE KNEE Right 12/9/2016    Procedure: AMPUTATION ABOVE KNEE;  Surgeon: Ivonne Ramos MD;  Location: [facility] OR;  Service:      AMPUTATE TOE(S) Right      EYE SURGERY Right      IR EXTREMITY ANGIOGRAM RIGHT  12/5/2016     OTHER SURGICAL HISTORY Right     stent       CURRENT MEDICATIONS:    No current facility-administered medications for this encounter.     Current Outpatient Medications   Medication     erythromycin (ROMYCIN) 5 MG/GM ophthalmic ointment     acetaminophen (TYLENOL) 325 MG tablet     amLODIPine (NORVASC) 10 MG tablet     aspirin EC 81 MG EC tablet     atorvastatin (LIPITOR) 80 MG tablet     baclofen (LIORESAL) 10 MG tablet     citalopram (CELEXA) 20 MG tablet     gabapentin (NEURONTIN) 300 MG capsule     insulin lispro (HUMALOG) 100 UNIT/ML injection     ROSUVASTATIN CALCIUM PO       ALLERGIES:  No Known Allergies    FAMILY HISTORY:  Family History   Problem Relation Age of Onset     No Known Problems Mother      No Known Problems Father         SOCIAL HISTORY:   Social History     Socioeconomic History     Marital status: Single   Tobacco Use     Smoking status: Former Smoker     Packs/day: 0.50     Years: 20.00     Pack years: 10.00     Types: Cigarettes     Quit date: 2016     Years since quittin.7     Smokeless tobacco: Never Used   Substance and Sexual Activity     Alcohol use: No     Drug use: No       VITALS:  BP (!) 154/83   Pulse 79   Temp 98.3  F (36.8  C) (Temporal)   Resp 18   Ht 1.829 m (6')   Wt 79.4 kg (175 lb)   SpO2 97%   BMI 23.73 kg/m      PHYSICAL EXAM:  Physical Exam  Vitals and nursing note reviewed.   Constitutional:       Appearance: Normal appearance.   HENT:      Head: Normocephalic and atraumatic.      Right Ear: External ear normal.      Left Ear: External ear normal.      Nose: Nose normal.   Eyes:      Extraocular Movements: Extraocular movements intact.      Conjunctiva/sclera:      Left eye: Left conjunctiva is injected.      Pupils: Pupils are unequal (constricted left pupil).      Left eye: Corneal abrasion (3 mm irregular corneal abrasion to left medial iris) present.      Comments: Disconjugate gaze   Pulmonary:      Effort: Pulmonary effort is normal.   Musculoskeletal:         General: No swelling or deformity. Normal range of motion.      Cervical back: Normal range of motion.   Neurological:      General: No focal deficit present.      Mental Status: He is alert and oriented to person, place, and time. Mental status is at baseline.   Psychiatric:         Mood and Affect: Mood normal.         Behavior: Behavior normal.         Thought Content: Thought content normal.            I,  Nette Reyes, am serving as a scribe to document services personally performed by Dr. Underwood based on my observation and the provider's statements to me. I, Dagoberto Underwood MD attest that Nette Reyes is acting in a scribe capacity, has observed my performance of the services and has documented them in  accordance with my direction.    Dagoberto Underwood M.D.  Emergency Medicine  [facility] EMERGENCY ROOM  [address] [facility] [id number] [phone]  Dept: [phone]       Dagoberto Underwood MD  06/11/22 [id number]

## 2023-06-04 ENCOUNTER — APPOINTMENT (OUTPATIENT)
Dept: RADIOLOGY | Facility: CLINIC | Age: 43
End: 2023-06-04
Attending: EMERGENCY MEDICINE
Payer: MEDICARE

## 2023-06-04 ENCOUNTER — HOSPITAL ENCOUNTER (EMERGENCY)
Facility: CLINIC | Age: 43
Discharge: HOME | End: 2023-06-04
Attending: EMERGENCY MEDICINE | Admitting: EMERGENCY MEDICINE
Payer: MEDICARE

## 2023-06-04 VITALS
BODY MASS INDEX: 23.7 KG/M2 | TEMPERATURE: 98.6 F | WEIGHT: 175 LBS | OXYGEN SATURATION: 97 % | HEIGHT: 72 IN | DIASTOLIC BLOOD PRESSURE: 62 MMHG | RESPIRATION RATE: 16 BRPM | SYSTOLIC BLOOD PRESSURE: 126 MMHG | HEART RATE: 77 BPM

## 2023-06-04 DIAGNOSIS — L03.012 ACUTE PARONYCHIA OF FINGER, LEFT: ICD-10-CM

## 2023-06-04 PROCEDURE — 10060 I&D ABSCESS SIMPLE/SINGLE: CPT | Mod: F2

## 2023-06-04 PROCEDURE — 73130 X-RAY EXAM OF HAND: CPT | Mod: LT

## 2023-06-04 PROCEDURE — 99283 EMERGENCY DEPT VISIT LOW MDM: CPT | Mod: 25

## 2023-06-04 RX ORDER — SULFAMETHOXAZOLE/TRIMETHOPRIM 800-160 MG
2 TABLET ORAL 2 TIMES DAILY
Qty: 28 TABLET | Refills: 0 | Status: SHIPPED | OUTPATIENT
Start: 2023-06-04 | End: 2023-06-11

## 2023-06-04 ASSESSMENT — ACTIVITIES OF DAILY LIVING (ADL): ADLS_ACUITY_SCORE: 35

## 2023-06-05 NOTE — ED PROVIDER NOTES
EMERGENCY DEPARTMENT ENCOUNTER      NAME: Huber Jansen  AGE: 42 year old male  YOB: 1980  MRN: [id number]  EVALUATION DATE & TIME: 6/4/2023  9:43 PM    PCP: Royal Motta    ED PROVIDER: Marbella Colunga MD      Chief Complaint   Patient presents with     Hand Pain       FINAL IMPRESSION:  1. Acute paronychia of finger, left          ED COURSE & MEDICAL DECISION MAKING:    Pertinent Labs & Imaging studies reviewed. (See chart for details)    9:44 PM I introduced myself to the patient, obtained patient history, performed a physical exam, and discussed plan for ED workup including potential diagnostic laboratory/imaging studies and interventions.  10:34 PM Rechecked and reevaluated the patient. Performed I&D as charted below.    42 year old male presents to the Emergency Department for evaluation of left middle finger pain.,  The patient has what appears to be a paronychia on the medial aspect near the nail of the left middle finger.  Question whether potentially there was a small puncture wound from the fence there that allowed infection to begin.  Patient is also wondering this but does not remember exactly.  He is tender over this area of abscess with purulence noted.  He is up-to-date on his tetanus.  X-ray of the left hand was performed to ensure no sign of foreign body, osteomyelitis, gas, fracture, etc.  He does not have any specific bony tenderness of the left middle finger.  Has normal range of motion of the left DIP, PIP, and MCP joints tested individually.  He is neurovascularly intact.  Good distal cap refill.  No sign of flexor or extensor tenosynovitis.  Digital block was performed with a mixture of lidocaine and bupivacaine without epinephrine.  He had successful anesthesia with this.  X-ray was unremarkable Without sign of fracture, dislocation, foreign body, or other abnormality.  No sign of osteomyelitis.  The paronychia was then drained by I&D as described below.  He tolerated this  well.  Purulent material expressed.  This was left open for continued drainage.  Discussed warm soaks to promote continued drainage.  Due to his diabetes will also place him on antibiotics and wrote him a prescription for Bactrim.  Discussed following up with his primary care next week for reevaluation.  He was in agreement with this plan.  He was given indications for return the emergency department.  He voiced understanding.  He was discharged home in stable condition.         At the conclusion of the encounter I discussed the results of all of the tests and the disposition. The questions were answered. The patient or family acknowledged understanding and was agreeable with the care plan.         Medical Decision Making    History:    Supplemental history from: Documented in chart, if applicable    External Record(s) reviewed: Documented in chart, if applicable.    Work Up:    Chart documentation includes differential considered and any EKGs or imaging independently interpreted by provider.    In additional to work up documented, I considered the following work up: Documented in chart, if applicable.    External consultation:    Discussion of management with another provider: Documented in chart, if applicable    Complicating factors:    Care impacted by chronic illness: Cerebrovascular Disease, Diabetes, Hyperlipidemia and Hypertension    Care affected by social determinants of health: N/A    Disposition considerations: Discharge. I prescribed additional prescription strength medication(s) as charted. See documentation for any additional details.      MEDICATIONS GIVEN IN THE EMERGENCY:  Medications - No data to display    NEW PRESCRIPTIONS STARTED AT TODAY'S ER VISIT  New Prescriptions    SULFAMETHOXAZOLE-TRIMETHOPRIM (BACTRIM DS) 800-160 MG TABLET    Take 2 tablets by mouth 2 times daily for 7 days     =================================================================    HPI    Patient information was obtained  from: patient    Use of : N/A      Huber Jansen is a 42 year old male with a pertinent history of type I DM, HLD, HTN, CVA with right sided deficits, who presents to this ED for evaluation of left middle finger pain. Patient reports he was hanging a metal fence on Wednesday (5/31) and the door of the fence smashed his left middle finger. Immediate pain in the tip of the left middle finger that has been worsening over the last couple of days. Initially states that there was never any open wounds or cuts but then wonders if the fence may have poked slightly under his nail. Denies any numbness or tingling. He took ibuprofen earlier today with mild relief. He denies any other injuries. Of note, patient reports he is now left hand dominant secondary to right sided deficits with history of stroke.    Patient otherwise denies any fevers. No nausea or vomiting. History of type I DM. Reports that his blood sugars have been around the 190s. Otherwise denies any complaints or concerns.    Of note, last Tdap 1/3/2017.    REVIEW OF SYSTEMS   Review of Systems   Pertinent positives and negatives are documented in the HPI. All other systems reviewed and are negative.      PAST MEDICAL HISTORY:  History reviewed. No pertinent past medical history.    PAST SURGICAL HISTORY:  Past Surgical History:   Procedure Laterality Date     AMPUTATE LEG ABOVE KNEE Right 12/9/2016    Procedure: AMPUTATION ABOVE KNEE;  Surgeon: Ivonne Ramos MD;  Location: [facility];  Service:      AMPUTATE TOE(S) Right      EYE SURGERY Right      IR EXTREMITY ANGIOGRAM RIGHT  12/5/2016     IR LOWER EXTREMITY ANGIOGRAM RIGHT  10/6/2016     OTHER SURGICAL HISTORY Right     stent       CURRENT MEDICATIONS:    sulfamethoxazole-trimethoprim (BACTRIM DS) 800-160 MG tablet  acetaminophen (TYLENOL) 325 MG tablet  amLODIPine (NORVASC) 10 MG tablet  aspirin EC 81 MG EC tablet  atorvastatin (LIPITOR) 80 MG tablet  baclofen (LIORESAL) 10 MG  tablet  citalopram (CELEXA) 20 MG tablet  gabapentin (NEURONTIN) 300 MG capsule  insulin lispro (HUMALOG) 100 UNIT/ML injection  ROSUVASTATIN CALCIUM PO      ALLERGIES:  No Known Allergies    FAMILY HISTORY:  Family History   Problem Relation Age of Onset     No Known Problems Mother      No Known Problems Father        SOCIAL HISTORY:   Social History     Socioeconomic History     Marital status: Single   Tobacco Use     Smoking status: Former     Packs/day: 0.50     Years: 20.00     Pack years: 10.00     Types: Cigarettes     Quit date: 2016     Years since quittin.7     Smokeless tobacco: Never   Substance and Sexual Activity     Alcohol use: No     Drug use: No       VITALS:  BP (!) 149/90   Pulse 87   Temp 98.6  F (37  C) (Oral)   Resp 20   Ht 1.829 m (6')   Wt 79.4 kg (175 lb)   SpO2 99%   BMI 23.73 kg/m      PHYSICAL EXAM    Physical Exam  Constitutional: Well developed, Well nourished, NAD, GCS 15  HENT: Normocephalic, Atraumatic, Bilateral external ears normal, Oropharynx normal, mucous membranes moist, Nose normal. Neck-  Normal range of motion, No tenderness, Supple, No stridor.    Eyes: PERRL, EOMI, Conjunctiva normal, No discharge.   Respiratory: Normal breath sounds, No respiratory distress, No wheezing or crackles, Speaks in full sentences easily.    Cardiovascular: Normal heart rate, Regular rhythm, No murmurs, No rubs, No gallops. 2+ radial pulses bilaterally  GI: Bowel sounds normal, Soft, No tendernes   Musculoskeletal: 2+ DP and radial pulses bilaterally.  Paronychia noted to the left middle finger just medial to the nail with fluctuance and purulence noted under the skin.  No obvious open wounds noted.  No spreading erythema.  No swelling of the left middle digit other than specifically at the paronychia, no significant erythema or induration or fluctuance or tenderness to the remainder of the pulp of the left distal finger.  No sign of felon.  The pulp compartment was soft and  compressible.  No further swelling of the left middle finger.  Normal range of motion at the DIP, PIP, and MCP joint of the left middle finger tested individually.  Sensation intact to light touch in the median, ulnar, and radial aspect with normal  strength of the left hand.  No bony tenderness of the left middle finger.  No tenderness into the left hand.  No tenderness at the left anatomical snuffbox.  Otherwise normal range of motion of the remainder of the fingers and no tenderness of the remainder of the fingers.   Integument: Warm, Dry, paronychia as above  Neurologic: Alert & oriented x 3, 5/5 strength in all 4 extremities bilaterally. Sensation intact to light touch in all 4 extremities and the face bilaterally. No focal deficits noted.   Psychiatric: Affect normal, Judgment normal, Mood normal. Cooperative.      LAB:  All pertinent labs reviewed and interpreted.  Results for orders placed or performed during the hospital encounter of 06/04/23   XR Hand Left G/E 3 Views    Impression    IMPRESSION: No visible fracture or dislocation. Vascular calcification. Chondrocalcinosis of the wrist.       RADIOLOGY:  Reviewed all pertinent imaging. Please see official radiology report.  XR Hand Left G/E 3 Views   Preliminary Result   IMPRESSION: No visible fracture or dislocation. Vascular calcification. Chondrocalcinosis of the wrist.          PROCEDURES:   PROCEDURE: Incision and Drainage   INDICATIONS: Paronychia    PROCEDURE PROVIDER: Dr Marbella Colunga   SITE: Left middle finger   MEDICATION: 2 mLs of 50/50 mix of 1% Lidocaine and 0.5% Bupivacaine without epinephrine   NOTE: The area was prepped with chlorhexidine and draped off in the usual sterile fashion.  Digital block of the finger performed as documented below.  The area of maximal fluctuance was opened with a # 11 Blade (Sharp Point) using a Single Straight incision to allow for drainage.  The abscess was drained. No Packing was placed into the abscess  cavity.  A sterile dressing was placed over the area.   COMPLEXITY: Simple    Simple = single, furuncle, paronychia, superficial  Complex = multiple or abscess requiring probing, loculations, packing placement   COMPLICATIONS: Patient tolerated procedure well, without complication     PROCEDURE: Digital Block   INDICATIONS: Paronychia    PROCEDURE PROVIDER: Dr Marbella Colunga   SITE: Left middle finger (3rd digit)   MEDICATION: 2 mL of 50/50 mix of 1% Lidocaine and 0.5% Bupivacaine without epinephrine   NOTE: The skin overlying the site for injection was prepped with chlorhexidine.  Needle was inserted in a standard three point injection pattern.  Each time the area was aspirated and there was no return of blood.  I then injected the medication at the base of the digit.  The patient had good response to the procedure   COMPLICATIONS: Patient tolerated procedure well, without complication       [facility] Documentation:   CMS Diagnoses:         I, Betito Rasheed, am serving as a scribe to document services personally performed by Marbella Colunga MD based on my observation and the provider's statements to me. I, Marbella Colunga MD, attest that Betito Rasheed is acting in a scribe capacity, has observed my performance of the services and has documented them in accordance with my direction.    Marbella Colunga MD  [facility] EMERGENCY ROOM  [address] [address] [phone]     Marbella Colunga MD  06/15/23 [id number]

## 2023-06-05 NOTE — DISCHARGE INSTRUCTIONS
Warm soaks 4 times a day to promote continued drainage.  Take the antibiotics as prescribed.  Follow-up with your primary care doctor next week for reevaluation.    Return to the ER if you develop fever, severe pain or you are unable to move the finger, worsening swelling, worsening erythema, reaccumulation of pus, numbness, weakness, or any new or worsening concerns.

## 2023-06-05 NOTE — ED TRIAGE NOTES
On Wednesday Pt was hanging a mettle fence and his LT middle finger was smashed in it. Pt is in tonight because the pain and pressure is feeling worse tonight. PT took a dose of ibuprofen around 1700 tonight.      Triage Assessment     Row Name 06/04/23 2746       Triage Assessment (Adult)    Airway WDL WDL       Respiratory WDL    Respiratory WDL WDL       Skin Circulation/Temperature WDL    Skin Circulation/Temperature WDL WDL       Cardiac WDL    Cardiac WDL WDL       Peripheral/Neurovascular WDL    Peripheral Neurovascular WDL WDL       Cognitive/Neuro/Behavioral WDL    Cognitive/Neuro/Behavioral WDL WDL

## 2023-09-03 ENCOUNTER — HOSPITAL ENCOUNTER (EMERGENCY)
Facility: HOSPITAL | Age: 43
Discharge: HOME | End: 2023-09-03
Attending: EMERGENCY MEDICINE | Admitting: EMERGENCY MEDICINE
Payer: MEDICARE

## 2023-09-03 VITALS
TEMPERATURE: 98 F | HEIGHT: 72 IN | RESPIRATION RATE: 18 BRPM | HEART RATE: 78 BPM | OXYGEN SATURATION: 98 % | DIASTOLIC BLOOD PRESSURE: 61 MMHG | WEIGHT: 160 LBS | BODY MASS INDEX: 21.67 KG/M2 | SYSTOLIC BLOOD PRESSURE: 135 MMHG

## 2023-09-03 DIAGNOSIS — M25.512 ACUTE PAIN OF LEFT SHOULDER: ICD-10-CM

## 2023-09-03 PROCEDURE — 99282 EMERGENCY DEPT VISIT SF MDM: CPT

## 2023-09-03 RX ORDER — IBUPROFEN 600 MG/1
600 TABLET, FILM COATED ORAL ONCE
Status: DISCONTINUED | OUTPATIENT
Start: 2023-09-03 | End: 2023-09-03 | Stop reason: HOSPADM

## 2023-09-03 RX ORDER — IBUPROFEN 400 MG/1
400 TABLET, FILM COATED ORAL EVERY 6 HOURS PRN
Qty: 20 TABLET | Refills: 0 | Status: SHIPPED | OUTPATIENT
Start: 2023-09-03

## 2023-09-03 RX ORDER — METHOCARBAMOL 500 MG/1
500 TABLET, FILM COATED ORAL EVERY 6 HOURS PRN
Qty: 20 TABLET | Refills: 0 | Status: SHIPPED | OUTPATIENT
Start: 2023-09-03

## 2023-09-03 NOTE — ED PROVIDER NOTES
EMERGENCY DEPARTMENT ENCOUNTER      NAME: Huber Jansen  AGE: 42 year old male  YOB: 1980  MRN: [id number]  EVALUATION DATE & TIME: No admission date for patient encounter.    PCP: Royal Motta    ED PROVIDER: Peggy Barr M.D.      Chief Complaint   Patient presents with    Shoulder Pain         FINAL IMPRESSION:  1. Acute pain of left shoulder          ED COURSE & MEDICAL DECISION MAKING:    ED Course as of 09/03/23 1909   Sun Sep 03, 2023   1700 Patient with discomfort diffusely to left shoulder but full adduction and abduction, rotation internally and externally appreciated in neurovascularly intact joint with normal joint appearance and no trauma or rash, ameanble to begin robaxin and ibuprofen with pain since he fell asleep on that side, Rx to preferred pharmacy. Patient discharged after being provided with extensive anticipatory guidance and given return precautions, importance of PMD follow-up emphasized.        Pertinent Labs & Imaging studies reviewed. (See chart for details)    N95 worn  A face shield was worn also  COVID PPE    Medical Decision Making    History:  Supplemental history from: Family Member/Significant Other  External Record(s) reviewed: Documented in chart, if applicable.    Work Up:  Chart documentation includes differential considered and any EKGs or imaging independently interpreted by provider, where specified.  In additional to work up documented, I considered the following work up: Documented in chart, if applicable.    External consultation:  Discussion of management with another provider: Documented in chart, if applicable    Complicating factors:  Care impacted by chronic illness: Cerebrovascular Disease and Diabetes  Care affected by social determinants of health: N/A    Disposition considerations: Discharge. I prescribed additional prescription strength medication(s) as charted. See documentation for any additional details.        At the conclusion of the  encounter I discussed the results of all of the tests and the disposition. The questions were answered. The patient or family acknowledged understanding and was agreeable with the care plan.     MEDICATIONS GIVEN IN THE EMERGENCY:  Medications   ibuprofen (ADVIL/MOTRIN) tablet 600 mg (has no administration in time range)       NEW PRESCRIPTIONS STARTED AT TODAY'S ER VISIT  Discharge Medication List as of 9/3/2023  5:22 PM        START taking these medications    Details   ibuprofen (ADVIL/MOTRIN) 400 MG tablet Take 1 tablet (400 mg) by mouth every 6 hours as needed for moderate pain, Disp-20 tablet, R-0, E-Prescribe      methocarbamol (ROBAXIN) 500 MG tablet Take 1 tablet (500 mg) by mouth every 6 hours as needed for muscle spasms, Disp-20 tablet, R-0, E-Prescribe                =================================================================    HPI      Huber Jansen is a 42 year old male with PMHx of type 1 diabetes and prior stroke with right sided deficits who presents to the ED today via wheelchair with shoulder pain.    Patient reports pain in his left shoulder extending into his armpit. He details that when he was going to bed last night he laid on his left side and lifted his left arm up, upon which he started feeling pain. He reports taking ibuprofen this morning at 10:00 and used a heat pad. He reports feeling nauseous. Patient denies falling on his shoulder. He mentions that his blood glucose was 365 today before taking insulin. Patient denies any cough or fever.        REVIEW OF SYSTEMS   All other systems reviewed and are negative except as noted above in HPI.    PAST MEDICAL HISTORY:  No past medical history on file.    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    Procedure: AMPUTATION ABOVE KNEE;  Surgeon: Ivonne Ramos MD;  [facility];  Service:     AMPUTATE TOE(S) Right     EYE SURGERY Right     IR  EXTREMITY ANGIOGRAM RIGHT  2016    IR LOWER EXTREMITY ANGIOGRAM RIGHT  10/6/2016    OTHER SURGICAL HISTORY Right     stent       CURRENT MEDICATIONS:    ibuprofen (ADVIL/MOTRIN) 400 MG tablet  methocarbamol (ROBAXIN) 500 MG tablet  acetaminophen (TYLENOL) 325 MG tablet  amLODIPine (NORVASC) 10 MG tablet  aspirin EC 81 MG EC tablet  atorvastatin (LIPITOR) 80 MG tablet  baclofen (LIORESAL) 10 MG tablet  citalopram (CELEXA) 20 MG tablet  gabapentin (NEURONTIN) 300 MG capsule  insulin lispro (HUMALOG) 100 UNIT/ML injection  ROSUVASTATIN CALCIUM PO        ALLERGIES:  No Known Allergies    FAMILY HISTORY:  Family History   Problem Relation Age of Onset    No Known Problems Mother     No Known Problems Father        SOCIAL HISTORY:   Social History     Socioeconomic History    Marital status: Single   Tobacco Use    Smoking status: Former     Packs/day: 0.50     Years: 20.00     Pack years: 10.00     Types: Cigarettes     Quit date: 2016     Years since quittin.9    Smokeless tobacco: Never   Substance and Sexual Activity    Alcohol use: No    Drug use: No       VITALS:  Patient Vitals for the past 24 hrs:   BP Temp Pulse Resp SpO2 Height Weight   23 1657 135/61 98  F (36.7  C) 78 18 98 % 1.829 m (6') 72.6 kg (160 lb)       PHYSICAL EXAM    GENERAL: Awake, alert.  In no acute distress.   HEENT: Normocephalic, atraumatic.  Pupils equal, round and reactive.  Conjunctiva normal.  EOMI.  NECK: No stridor or apparent deformity.  PULMONARY: Symmetrical breath sounds without distress.  Lungs clear to auscultation bilaterally without wheezes, rhonchi or rales.  CARDIO: Regular rate and rhythm.  No significant murmur, rub or gallop.  Radial pulses strong and symmetrical.  ABDOMINAL: Abdomen soft, non-distended and non-tender to palpation.  No CVAT, no palpable hepatosplenomegaly.  EXTREMITIES: No lower extremity swelling or edema. Diffusely tender left shoulder with full range of motion intact. Left radial pulse  2+ and bounding.  NEURO: Alert and oriented to person, place and time.  Cranial nerves grossly intact. Right arm does not move. Right lower extremity AKA noted.  PSYCH: Normal mood and affect  SKIN: No rashes      LAB:  All pertinent labs reviewed and interpreted.       RADIOLOGY:  Reviewed all pertinent imaging. Please see official radiology report.  No orders to display       I, Rachel Ahuja, am serving as a scribe to document services personally performed by Dr. Peggy Barr based on my observation and the provider's statements to me. I, Peggy Barr MD attest that Rachel Ahuja is acting in a scribe capacity, has observed my performance of the services and has documented them in accordance with my direction.       Peggy Barr MD  09/03/23 1433

## 2023-09-03 NOTE — ED TRIAGE NOTES
Patient reports pain in left shoulder, states that he was moving left arm when he felt a pain in the shoulder, states that pain is severe. Has use heating pad and ibuprofen for this. Patient is able to move arm.      Triage Assessment       Row Name 09/03/23 4152       Triage Assessment (Adult)    Airway WDL WDL       Respiratory WDL    Respiratory WDL WDL       Skin Circulation/Temperature WDL    Skin Circulation/Temperature WDL WDL       Cardiac WDL    Cardiac WDL WDL       Peripheral/Neurovascular WDL    Peripheral Neurovascular WDL WDL       Cognitive/Neuro/Behavioral WDL    Cognitive/Neuro/Behavioral WDL WDL

## 2023-09-03 NOTE — ED NOTES
This writer went to discharge patient and pt asked for ibuprofen before discharge - discharge papers and instructions went over with patient. . This writer went to get an order from Dr. Barr for ibuprofen (who was busy) Writer got order and went to give patient meds but pt left.

## 2024-11-13 ENCOUNTER — HOSPITAL ENCOUNTER (EMERGENCY)
Facility: HOSPITAL | Age: 44
Discharge: HOME | End: 2024-11-14
Payer: MEDICARE

## 2024-11-13 VITALS
DIASTOLIC BLOOD PRESSURE: 85 MMHG | SYSTOLIC BLOOD PRESSURE: 135 MMHG | HEART RATE: 84 BPM | RESPIRATION RATE: 20 BRPM | TEMPERATURE: 98.4 F | OXYGEN SATURATION: 92 %

## 2024-11-13 DIAGNOSIS — H57.11 ACUTE RIGHT EYE PAIN: ICD-10-CM

## 2024-11-13 PROCEDURE — 99283 EMERGENCY DEPT VISIT LOW MDM: CPT

## 2024-11-13 RX ORDER — POLYMYXIN B SULFATE AND TRIMETHOPRIM 1; 10000 MG/ML; [USP'U]/ML
1-2 SOLUTION OPHTHALMIC EVERY 4 HOURS
Qty: 10 ML | Refills: 0 | Status: SHIPPED | OUTPATIENT
Start: 2024-11-13

## 2024-11-13 RX ORDER — TETRACAINE HYDROCHLORIDE 5 MG/ML
1-2 SOLUTION OPHTHALMIC ONCE
Status: DISCONTINUED | OUTPATIENT
Start: 2024-11-13 | End: 2024-11-14 | Stop reason: HOSPADM

## 2024-11-13 ASSESSMENT — COLUMBIA-SUICIDE SEVERITY RATING SCALE - C-SSRS
2. HAVE YOU ACTUALLY HAD ANY THOUGHTS OF KILLING YOURSELF IN THE PAST MONTH?: NO
6. HAVE YOU EVER DONE ANYTHING, STARTED TO DO ANYTHING, OR PREPARED TO DO ANYTHING TO END YOUR LIFE?: NO
1. IN THE PAST MONTH, HAVE YOU WISHED YOU WERE DEAD OR WISHED YOU COULD GO TO SLEEP AND NOT WAKE UP?: NO

## 2024-11-13 ASSESSMENT — ACTIVITIES OF DAILY LIVING (ADL)
ADLS_ACUITY_SCORE: 0

## 2024-11-14 ASSESSMENT — ACTIVITIES OF DAILY LIVING (ADL)
ADLS_ACUITY_SCORE: 0

## 2024-11-14 NOTE — ED TRIAGE NOTES
Patient here with right eye pain and redness that started this evening while cleaning his car. States he thinks he might've got some cleaning chemicals in his eye but is unsure.     Triage Assessment (Adult)       Row Name 11/13/24 [id number]          Triage Assessment    Airway WDL WDL        Respiratory WDL    Respiratory WDL WDL        Skin Circulation/Temperature WDL    Skin Circulation/Temperature WDL WDL        Cardiac WDL    Cardiac WDL WDL        Peripheral/Neurovascular WDL    Peripheral Neurovascular WDL WDL        Cognitive/Neuro/Behavioral WDL    Cognitive/Neuro/Behavioral WDL WDL

## 2024-11-14 NOTE — DISCHARGE INSTRUCTIONS
You were seen here today for evaluation of right eye pain. Your eye pH today was normal and exam did not show any obvious abrasions or scratches.     Apply antibiotic drops into the eye every 4 hours while awake x 5 days. You may take Tylenol and ibuprofen for pain/fever, do not exceed 4000 mg of Tylenol per day or 3200 mg ofibuprofen per day.    Follow up with your eye doctor in 48 hours for recheck. Return here for any new or worsening symptoms including severe pain, loss of vision, swelling/redness around the eye, fevers, or any other symptoms that concern you.

## 2024-11-14 NOTE — ED PROVIDER NOTES
EMERGENCY DEPARTMENT ENCOUNTER      NAME: Huber Jansen  AGE: 44 year old male  YOB: 1980  MRN: [id number]  EVALUATION DATE & TIME: No admission date for patient encounter.    PCP: Royal Motta    ED PROVIDER: Margy Wilson PA-C      Chief Complaint   Patient presents with    Eye Problem         FINAL IMPRESSION:  1. Acute right eye pain          ED COURSE & MEDICAL DECISION MAKING:    Pertinent Labs & Imaging studies reviewed. (See chart for details)    44 year old male presents to the Emergency Department for evaluation of an eye problem.    Physical exam is remarkable for a generally well-appearing male who is in no acute distress.  Right conjunctiva is mildly injected but otherwise unremarkable appearing.  There is no significant swelling, warmth, or erythema surrounding the eye.  Right pupil is reactive, normal extraocular motions.  Left eye is cloudy but patient states this is baseline.  Vital signs are stable and he is afebrile.    pH of the eye was between 7 and 8 as expected.  His eye was examined after tetracaine drops were applied with fluorescein, no obvious corneal abrasions, scratches, or ulcers.  No evidence of foreign body in the eye with eyelid eversion.  I do not think any emergent labs or imaging are indicated at this time.  The patient denies any significant vision change and his pain has actually been improving over the last 2 hours.  Remaining pain improved with tetracaine which is consistent with anterior chamber pathology.  Given his history of blindness in his left eye and diabetes, we will still treat proactively to prevent infection with Polytrim drops.  Recommend follow-up with his ophthalmologist for recheck and return here for any new or worsening symptoms.  The patient is agreeable with this treatment plan and verbalized understanding.    Medical Decision Making  Obtained supplemental history:Supplemental history obtained?: No  Reviewed external records: External  records reviewed?: No  Care impacted by chronic illness:Diabetes  Care significantly affected by social determinants of health:Access to Medical Care  Did you consider but not order tests?: Work up considered but not performed and documented in chart, if applicable  Did you interpret images independently?: Independent interpretation of ECG and images noted in documentation, when applicable.  Consultation discussion with other provider:Did you involve another provider (consultant, MH, pharmacy, etc.)?: No  Discharge. I prescribed additional prescription strength medication(s) as charted. N/A.  Not Applicable      ED Course   7:40 PM Performed my initial history and physical exam. Discussed workup in the emergency department, management of symptoms, and likely disposition. I discussed the plan for discharge with the patient or family and they are agreeable.. We discussed supportive cares at home and reasons for return to the ER including new or worsening symptoms - all questions and concerns addressed. Patient to be discharged by RN.    At the conclusion of the encounter I discussed the results of all of the tests and the disposition. The questions were answered. The patient or family acknowledged understanding and was agreeable with the care plan.     Voice recognition software was used in the creation of this note. Any grammatical or nonsensical errors are due to inherent errors with the software and are not the intention of the writer.     MEDICATIONS GIVEN IN THE EMERGENCY:  Medications   fluorescein (FUL-KARLY) ophthalmic strip 1 strip (has no administration in time range)   tetracaine (PONTOCAINE) 0.5 % ophthalmic solution 1-2 drop (has no administration in time range)       NEW PRESCRIPTIONS STARTED AT TODAY'S ER VISIT  New Prescriptions    POLYMIXIN B-TRIMETHOPRIM (POLYTRIM) 35867-5.1 UNIT/ML-% OPHTHALMIC SOLUTION    Place 1-2 drops into the right eye every 4 hours.         "    =================================================================    HPI    Patient information was obtained from: Patient    Use of : N/A      Huber Jansen is a 44 year old male with PMH of DM1, left eye blindness who presents to the ED via walk-in for evaluation of an eye problem.     The patient reports that about two hours ago, he was washing his windshield and mirrors on his car when he felt sudden onset of pain in his right eye. He thought initially something may have gotten in the eye or he may have touched it with windex. His eye was watering significantly which subsequently blurred his vision. He notes that his pain and FB sensation have improved since onset but he is blind in the left  eye so wants to make sure his \"good eye\" isn't damaged. He did apply an unknown drop into his right eye prior to arrival.      REVIEW OF SYSTEMS   Review of Systems    All other systems reviewed and are negative unless noted in HPI.      PAST MEDICAL HISTORY:  No past medical history on file.    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    Procedure: AMPUTATION ABOVE KNEE;  Surgeon: Ivonne Ramos MD;  Location: [facility];  Service:     AMPUTATE TOE(S) Right     EYE SURGERY Right     IR EXTREMITY ANGIOGRAM RIGHT  12/5/2016    IR LOWER EXTREMITY ANGIOGRAM RIGHT  10/6/2016    OTHER SURGICAL HISTORY Right     stent       CURRENT MEDICATIONS:    acetaminophen (TYLENOL) 325 MG tablet  amLODIPine (NORVASC) 10 MG tablet  aspirin EC 81 MG EC tablet  atorvastatin (LIPITOR) 80 MG tablet  baclofen (LIORESAL) 10 MG tablet  citalopram (CELEXA) 20 MG tablet  gabapentin (NEURONTIN) 300 MG capsule  ibuprofen (ADVIL/MOTRIN) 400 MG tablet  insulin lispro (HUMALOG) 100 UNIT/ML injection  methocarbamol (ROBAXIN) 500 MG tablet  polymixin b-trimethoprim (POLYTRIM) 60684-8.1 UNIT/ML-% ophthalmic solution  ROSUVASTATIN CALCIUM " PO        ALLERGIES:  No Known Allergies    FAMILY HISTORY:  Family History   Problem Relation Age of Onset    No Known Problems Mother     No Known Problems Father        SOCIAL HISTORY:   Social History     Socioeconomic History    Marital status: Single   Tobacco Use    Smoking status: Former     Current packs/day: 0.00     Average packs/day: 0.5 packs/day for 20.0 years (10.0 ttl pk-yrs)     Types: Cigarettes     Start date: 1996     Quit date: 2016     Years since quittin.1    Smokeless tobacco: Never   Substance and Sexual Activity    Alcohol use: No    Drug use: No     Social Drivers of Health      Received from [facility]    Financial Resource Strain    Received from [employer] [employer]    Social Connections       VITALS:  Patient Vitals for the past 24 hrs:   BP Temp Temp src Pulse Resp SpO2   24 1826 135/85 98.4  F (36.9  C) Temporal 84 20 92 %       PHYSICAL EXAM    VITAL SIGNS: /85   Pulse 84   Temp 98.4  F (36.9  C) (Temporal)   Resp 20   SpO2 92%   General Appearance: Alert, cooperative, normal speech and facial symmetry, appears stated age, the patient does not appear in distress  Head:  Normocephalic, without obvious abnormality, atraumatic  Eyes:  Right conjunctiva is mildly injected but otherwise unremarkable appearing.  There is no significant swelling, warmth, or erythema surrounding the eye.  Right pupil is reactive, normal extraocular motions.  Left eye is cloudy but patient states this is baseline  Neuro: Patient is awake, alert, and responsive to voice. No gross motor weaknesses or sensory loss; moves all extremities.    LAB:  All pertinent labs reviewed and interpreted.  Labs Ordered and Resulted from Time of ED Arrival to Time of ED Departure - No data to display    RADIOLOGY:  Reviewed all pertinent imaging.  Please see official radiology report.  No orders to display         Margy Wilson PA-C  Emergency Medicine  [facility] EMERGENCY DEPARTMENT  [address] [place] [address] [phone] [phone]  Dept: [phone]       Margy Wilson PA-C  11/13/24 1954